# Patient Record
Sex: MALE | Race: WHITE | Employment: STUDENT | URBAN - METROPOLITAN AREA
[De-identification: names, ages, dates, MRNs, and addresses within clinical notes are randomized per-mention and may not be internally consistent; named-entity substitution may affect disease eponyms.]

---
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
## 2023-05-11 ENCOUNTER — OFFICE VISIT (OUTPATIENT)
Dept: OTOLARYNGOLOGY | Facility: CLINIC | Age: 23
End: 2023-05-11
Payer: COMMERCIAL

## 2023-05-11 VITALS — DIASTOLIC BLOOD PRESSURE: 90 MMHG | SYSTOLIC BLOOD PRESSURE: 152 MMHG | HEART RATE: 70 BPM

## 2023-05-11 DIAGNOSIS — J34.2 DEVIATED NASAL SEPTUM: ICD-10-CM

## 2023-05-11 DIAGNOSIS — J35.01 CHRONIC TONSILLITIS: ICD-10-CM

## 2023-05-11 DIAGNOSIS — J30.1 SEASONAL ALLERGIC RHINITIS DUE TO POLLEN: Primary | ICD-10-CM

## 2023-05-11 DIAGNOSIS — J34.89 NASAL OBSTRUCTION: ICD-10-CM

## 2023-05-11 PROCEDURE — 99204 OFFICE O/P NEW MOD 45 MIN: CPT | Performed by: OTOLARYNGOLOGY

## 2023-05-11 RX ORDER — AZELASTINE 1 MG/ML
2 SPRAY, METERED NASAL 2 TIMES DAILY
Qty: 30 ML | Refills: 6 | Status: SHIPPED | OUTPATIENT
Start: 2023-05-11

## 2023-05-11 RX ORDER — LEVOCETIRIZINE DIHYDROCHLORIDE 5 MG/1
5 TABLET, FILM COATED ORAL EVERY EVENING
Qty: 60 TABLET | Refills: 4 | Status: SHIPPED | OUTPATIENT
Start: 2023-05-11

## 2023-05-11 NOTE — PROGRESS NOTES
History of Present Illness - Ruth Castelan is a very pleasant 22 year old male here to see me for the first time but has seen Dr Gonzalez in June 2020 for tonsil hypertrophy and chronic tonsillitis.  He recommended surgery at that time, but the patient and family decided to wait.    He tells me that this is a new issue.  He has noticed that his entire life he has had issues with chronic nasal congestion.  It alternates back and forth.  He does report he had a bad facial injury as a small child.  He hit his nose on the edge of the stairs.  He does note recall if he was taken to the hospital or not.    He has had a few episodes of sinusitis, but nothing chronic.  He does have seasonal allergies, but has never been to an Allergist or had allergy testing.  He uses OTC flonase and some oral antihistamine      Otherwise no previous surgery to the ENT.  He does mention that he still has issues with chronic tonsillitis and tonsil stones    Past Medical History -   Patient Active Problem List   Diagnosis     BMI (body mass index), pediatric, 95-99% for age       Current Medications - No current outpatient medications on file.    Allergies - No Known Allergies    Social History -   Social History     Socioeconomic History     Marital status: Single   Tobacco Use     Smoking status: Never     Smokeless tobacco: Never   Substance and Sexual Activity     Alcohol use: No     Drug use: No     Sexual activity: Never       Family History -   Family History   Problem Relation Age of Onset     Diabetes Maternal Grandmother      Allergies Other      Thyroid Disease Mother      Cancer No family hx of      Hypertension No family hx of      Cerebrovascular Disease No family hx of      Glaucoma No family hx of      Macular Degeneration No family hx of        Review of Systems - As per HPI and PMHx, otherwise 10+ system review of the head and neck, and general constitution is negative.    Physical Exam  BP (!) 152/90   Pulse 70      General - The patient is well nourished and well developed, and appears to have good nutritional status.  Alert and oriented to person and place, answers questions and cooperates with examination appropriately.   Head and Face - Normocephalic and atraumatic, with no gross asymmetry noted of the contour of the facial features.  The facial nerve is intact, with strong symmetric movements.  Voice and Breathing - The patient was breathing comfortably without the use of accessory muscles. There was no wheezing, stridor, or stertor.  The patients voice was clear and strong, and had appropriate pitch and quality.  Ears - The tympanic membranes are normal in appearance, bony landmarks are intact.  No retraction, perforation, or masses.  No fluid or purulence was seen in the external canal or the middle ear. No evidence of infection of the middle ear or external canal, cerumen was normal in appearance.  Eyes - Extraocular movements intact, and the pupils were reactive to light.  Sclera were not icteric or injected, conjunctiva were pink and moist.  Mouth - Examination of the oral cavity showed pink, healthy oral mucosa. No lesions or ulcerations noted.  The tongue was mobile and midline, and the dentition were in good condition.    Throat - The walls of the oropharynx were smooth, pink, moist, symmetric, and had no lesions or ulcerations.  The tonsillar pillars and soft palate were symmetric.  The uvula was midline on elevation.  The tonsils are not that large, but full of crypts and tonsil stones with exudates  Neck - Normal midline excursion of the laryngotracheal complex during swallowing.  Full range of motion on passive movement.  Palpation of the occipital, submental, submandibular, internal jugular chain, and supraclavicular nodes did not demonstrate any abnormal lymph nodes or masses.  The carotid pulse was palpable bilaterally.  Palpation of the thyroid was soft and smooth, with no nodules or goiter appreciated.   The trachea was mobile and midline.  Nose - External contour is symmetric, no gross deflection or scars.  Nasal mucosa is globally boggy and edematous.  The septum is deflected ot the LEFT and obstructive.      A/P - Ruth Castelan is a 22 year old male  (J30.1) Seasonal allergic rhinitis due to pollen  (primary encounter diagnosis)  (J34.2) Deviated nasal septum  (J34.89) Nasal obstruction  (J35.01) Chronic tonsillitis    There is definitely a deviated septum, but I think that allergies are also a major factor in his congestion. I will try him on Astelin, as well as Xyzal, one at a time.  If nothing helps, contact me and we can discuss septoplasty.    With regards to the tonsils, Based on the physical exam and history, my recommendation is for tonsillectomy (with possible adenoidectomy).  The remainder of the visit was spent discussing the risks and benefits of tonsillectomy.  These included:  The risks of general anesthesia, bleeding, infection, possible need for emergency surgery to control bleeding, possible alteration of speech and swallowing, and even the possibility of continued throat problems following surgery.  They understood and will think about.  Perhaps doing it simultaneously if we end up doing a septo

## 2023-05-11 NOTE — LETTER
5/11/2023         RE: Ruth Castelan  [address]  [place] [facility] [address]        Dear Colleague,    Thank you for referring your patient, Ruth Castelan, to the [facility]. Please see a copy of my visit note below.    History of Present Illness - Ruth Castelan is a very pleasant 22 year old male here to see me for the first time but has seen Dr Gonzalez in June 2020 for tonsil hypertrophy and chronic tonsillitis.  He recommended surgery at that time, but the patient and family decided to wait.    He tells me that this is a new issue.  He has noticed that his entire life he has had issues with chronic nasal congestion.  It alternates back and forth.  He does report he had a bad facial injury as a small child.  He hit his nose on the edge of the stairs.  He does note recall if he was taken to the hospital or not.    He has had a few episodes of sinusitis, but nothing chronic.  He does have seasonal allergies, but has never been to an Allergist or had allergy testing.  He uses OTC flonase and some oral antihistamine      Otherwise no previous surgery to the ENT.  He does mention that he still has issues with chronic tonsillitis and tonsil stones    Past Medical History -   Patient Active Problem List   Diagnosis     BMI (body mass index), pediatric, 95-99% for age       Current Medications - No current outpatient medications on file.    Allergies - No Known Allergies    Social History -   Social History     Socioeconomic History     Marital status: Single   Tobacco Use     Smoking status: Never     Smokeless tobacco: Never   Substance and Sexual Activity     Alcohol use: No     Drug use: No     Sexual activity: Never       Family History -   Family History   Problem Relation Age of Onset     Diabetes Maternal Grandmother      Allergies Other      Thyroid Disease Mother      Cancer No family hx of      Hypertension No family hx of      Cerebrovascular Disease No family hx of       Glaucoma No family hx of      Macular Degeneration No family hx of        Review of Systems - As per HPI and PMHx, otherwise 10+ system review of the head and neck, and general constitution is negative.    Physical Exam  BP (!) 152/90   Pulse 70     General - The patient is well nourished and well developed, and appears to have good nutritional status.  Alert and oriented to person and place, answers questions and cooperates with examination appropriately.   Head and Face - Normocephalic and atraumatic, with no gross asymmetry noted of the contour of the facial features.  The facial nerve is intact, with strong symmetric movements.  Voice and Breathing - The patient was breathing comfortably without the use of accessory muscles. There was no wheezing, stridor, or stertor.  The patients voice was clear and strong, and had appropriate pitch and quality.  Ears - The tympanic membranes are normal in appearance, bony landmarks are intact.  No retraction, perforation, or masses.  No fluid or purulence was seen in the external canal or the middle ear. No evidence of infection of the middle ear or external canal, cerumen was normal in appearance.  Eyes - Extraocular movements intact, and the pupils were reactive to light.  Sclera were not icteric or injected, conjunctiva were pink and moist.  Mouth - Examination of the oral cavity showed pink, healthy oral mucosa. No lesions or ulcerations noted.  The tongue was mobile and midline, and the dentition were in good condition.    Throat - The walls of the oropharynx were smooth, pink, moist, symmetric, and had no lesions or ulcerations.  The tonsillar pillars and soft palate were symmetric.  The uvula was midline on elevation.  The tonsils are not that large, but full of crypts and tonsil stones with exudates  Neck - Normal midline excursion of the laryngotracheal complex during swallowing.  Full range of motion on passive movement.  Palpation of the occipital, submental,  submandibular, internal jugular chain, and supraclavicular nodes did not demonstrate any abnormal lymph nodes or masses.  The carotid pulse was palpable bilaterally.  Palpation of the thyroid was soft and smooth, with no nodules or goiter appreciated.  The trachea was mobile and midline.  Nose - External contour is symmetric, no gross deflection or scars.  Nasal mucosa is globally boggy and edematous.  The septum is deflected ot the LEFT and obstructive.      A/P - Edvarbob Castelan is a 22 year old male  (J30.1) Seasonal allergic rhinitis due to pollen  (primary encounter diagnosis)  (J34.2) Deviated nasal septum  (J34.89) Nasal obstruction  (J35.01) Chronic tonsillitis    There is definitely a deviated septum, but I think that allergies are also a major factor in his congestion. I will try him on Astelin, as well as Xyzal, one at a time.  If nothing helps, contact me and we can discuss septoplasty.    With regards to the tonsils, Based on the physical exam and history, my recommendation is for tonsillectomy (with possible adenoidectomy).  The remainder of the visit was spent discussing the risks and benefits of tonsillectomy.  These included:  The risks of general anesthesia, bleeding, infection, possible need for emergency surgery to control bleeding, possible alteration of speech and swallowing, and even the possibility of continued throat problems following surgery.  They understood and will think about.  Perhaps doing it simultaneously if we end up doing a septo          Again, thank you for allowing me to participate in the care of your patient.        Sincerely,        Marin Dodson MD

## 2023-11-21 ENCOUNTER — OFFICE VISIT (OUTPATIENT)
Dept: FAMILY MEDICINE | Facility: CLINIC | Age: 23
End: 2023-11-21
Payer: COMMERCIAL

## 2023-11-21 VITALS
HEART RATE: 66 BPM | HEIGHT: 68 IN | BODY MASS INDEX: 30.22 KG/M2 | SYSTOLIC BLOOD PRESSURE: 132 MMHG | TEMPERATURE: 98.3 F | WEIGHT: 199.4 LBS | OXYGEN SATURATION: 100 % | RESPIRATION RATE: 20 BRPM | DIASTOLIC BLOOD PRESSURE: 89 MMHG

## 2023-11-21 DIAGNOSIS — M25.60 JOINT STIFFNESS: ICD-10-CM

## 2023-11-21 DIAGNOSIS — Z11.4 SCREENING FOR HUMAN IMMUNODEFICIENCY VIRUS: ICD-10-CM

## 2023-11-21 DIAGNOSIS — Z11.59 NEED FOR HEPATITIS C SCREENING TEST: ICD-10-CM

## 2023-11-21 DIAGNOSIS — R53.83 OTHER FATIGUE: ICD-10-CM

## 2023-11-21 DIAGNOSIS — Z86.19 HISTORY OF LYME DISEASE: ICD-10-CM

## 2023-11-21 DIAGNOSIS — M25.50 POLYARTHRALGIA: Primary | ICD-10-CM

## 2023-11-21 LAB
ALBUMIN SERPL BCG-MCNC: 4.9 G/DL (ref 3.5–5.2)
ALP SERPL-CCNC: 58 U/L (ref 40–150)
ALT SERPL W P-5'-P-CCNC: 35 U/L (ref 0–70)
ANION GAP SERPL CALCULATED.3IONS-SCNC: 8 MMOL/L (ref 7–15)
AST SERPL W P-5'-P-CCNC: 28 U/L (ref 0–45)
BASOPHILS # BLD AUTO: 0 10E3/UL (ref 0–0.2)
BASOPHILS NFR BLD AUTO: 1 %
BILIRUB SERPL-MCNC: 0.3 MG/DL
BUN SERPL-MCNC: 11.3 MG/DL (ref 6–20)
CALCIUM SERPL-MCNC: 9.9 MG/DL (ref 8.6–10)
CHLORIDE SERPL-SCNC: 103 MMOL/L (ref 98–107)
CREAT SERPL-MCNC: 0.9 MG/DL (ref 0.67–1.17)
CRP SERPL-MCNC: <3 MG/L
DEPRECATED HCO3 PLAS-SCNC: 28 MMOL/L (ref 22–29)
EGFRCR SERPLBLD CKD-EPI 2021: >90 ML/MIN/1.73M2
EOSINOPHIL # BLD AUTO: 0.2 10E3/UL (ref 0–0.7)
EOSINOPHIL NFR BLD AUTO: 4 %
ERYTHROCYTE [DISTWIDTH] IN BLOOD BY AUTOMATED COUNT: 11.7 % (ref 10–15)
ERYTHROCYTE [SEDIMENTATION RATE] IN BLOOD BY WESTERGREN METHOD: 4 MM/HR (ref 0–15)
FERRITIN SERPL-MCNC: 71 NG/ML (ref 31–409)
GLUCOSE SERPL-MCNC: 91 MG/DL (ref 70–99)
HCT VFR BLD AUTO: 44.7 % (ref 40–53)
HGB BLD-MCNC: 15.3 G/DL (ref 13.3–17.7)
HIV 1+2 AB+HIV1 P24 AG SERPL QL IA: NONREACTIVE
IMM GRANULOCYTES # BLD: 0 10E3/UL
IMM GRANULOCYTES NFR BLD: 0 %
LYMPHOCYTES # BLD AUTO: 2 10E3/UL (ref 0.8–5.3)
LYMPHOCYTES NFR BLD AUTO: 37 %
MCH RBC QN AUTO: 29.1 PG (ref 26.5–33)
MCHC RBC AUTO-ENTMCNC: 34.2 G/DL (ref 31.5–36.5)
MCV RBC AUTO: 85 FL (ref 78–100)
MONOCYTES # BLD AUTO: 0.4 10E3/UL (ref 0–1.3)
MONOCYTES NFR BLD AUTO: 7 %
NEUTROPHILS # BLD AUTO: 2.8 10E3/UL (ref 1.6–8.3)
NEUTROPHILS NFR BLD AUTO: 51 %
PLATELET # BLD AUTO: 265 10E3/UL (ref 150–450)
POTASSIUM SERPL-SCNC: 4.6 MMOL/L (ref 3.4–5.3)
PROT SERPL-MCNC: 7.9 G/DL (ref 6.4–8.3)
RBC # BLD AUTO: 5.26 10E6/UL (ref 4.4–5.9)
RHEUMATOID FACT SERPL-ACNC: <10 IU/ML
SODIUM SERPL-SCNC: 139 MMOL/L (ref 135–145)
TSH SERPL DL<=0.005 MIU/L-ACNC: 2 UIU/ML (ref 0.3–4.2)
URATE SERPL-MCNC: 7 MG/DL (ref 3.4–7)
VIT D+METAB SERPL-MCNC: 31 NG/ML (ref 20–50)
WBC # BLD AUTO: 5.4 10E3/UL (ref 4–11)

## 2023-11-21 PROCEDURE — 85025 COMPLETE CBC W/AUTO DIFF WBC: CPT | Performed by: FAMILY MEDICINE

## 2023-11-21 PROCEDURE — 99204 OFFICE O/P NEW MOD 45 MIN: CPT | Performed by: FAMILY MEDICINE

## 2023-11-21 PROCEDURE — 85652 RBC SED RATE AUTOMATED: CPT | Performed by: FAMILY MEDICINE

## 2023-11-21 PROCEDURE — 82306 VITAMIN D 25 HYDROXY: CPT | Performed by: FAMILY MEDICINE

## 2023-11-21 PROCEDURE — 99000 SPECIMEN HANDLING OFFICE-LAB: CPT | Performed by: FAMILY MEDICINE

## 2023-11-21 PROCEDURE — 36415 COLL VENOUS BLD VENIPUNCTURE: CPT | Performed by: FAMILY MEDICINE

## 2023-11-21 PROCEDURE — 86431 RHEUMATOID FACTOR QUANT: CPT | Performed by: FAMILY MEDICINE

## 2023-11-21 PROCEDURE — 86038 ANTINUCLEAR ANTIBODIES: CPT | Performed by: FAMILY MEDICINE

## 2023-11-21 PROCEDURE — 84443 ASSAY THYROID STIM HORMONE: CPT | Performed by: FAMILY MEDICINE

## 2023-11-21 PROCEDURE — 84550 ASSAY OF BLOOD/URIC ACID: CPT | Performed by: FAMILY MEDICINE

## 2023-11-21 PROCEDURE — 86618 LYME DISEASE ANTIBODY: CPT | Performed by: FAMILY MEDICINE

## 2023-11-21 PROCEDURE — 86140 C-REACTIVE PROTEIN: CPT | Performed by: FAMILY MEDICINE

## 2023-11-21 PROCEDURE — 86617 LYME DISEASE ANTIBODY: CPT | Performed by: FAMILY MEDICINE

## 2023-11-21 PROCEDURE — 86803 HEPATITIS C AB TEST: CPT | Performed by: FAMILY MEDICINE

## 2023-11-21 PROCEDURE — 87476 LYME DIS DNA AMP PROBE: CPT | Mod: 90 | Performed by: FAMILY MEDICINE

## 2023-11-21 PROCEDURE — 87389 HIV-1 AG W/HIV-1&-2 AB AG IA: CPT | Performed by: FAMILY MEDICINE

## 2023-11-21 PROCEDURE — 80053 COMPREHEN METABOLIC PANEL: CPT | Performed by: FAMILY MEDICINE

## 2023-11-21 PROCEDURE — 82728 ASSAY OF FERRITIN: CPT | Performed by: FAMILY MEDICINE

## 2023-11-21 NOTE — PROGRESS NOTES
Assessment & Plan     Polyarthralgia  With a previous history of Lyme disease history of tick bites and progressively worsening symptoms of polyarthralgia, morning stiffness and fatigue we will proceed with lab evaluation as mentioned below  Has family history of thyroid disease in mother   we will check the thyroid labs too  Will f/u on results and call with recommendations.  .vwer    ,  - CBC with platelets and differential; Future  - Comprehensive metabolic panel (BMP + Alb, Alk Phos, ALT, AST, Total. Bili, TP); Future  - TSH with free T4 reflex; Future  - ESR: Erythrocyte sedimentation rate; Future  - CRP, inflammation; Future  - Vitamin D Deficiency; Future  - Anti Nuclear Leonor IgG by IFA with Reflex; Future  - Rheumatoid factor; Future  - Lyme disease DNA detection by PCR; Future  - **Lyme Disease Total Abs Bld with Reflex to Confirm CLIA FUTURE 14d; Future  - Uric acid; Future  - CBC with platelets and differential  - Comprehensive metabolic panel (BMP + Alb, Alk Phos, ALT, AST, Total. Bili, TP)  - TSH with free T4 reflex  - ESR: Erythrocyte sedimentation rate  - CRP, inflammation  - Vitamin D Deficiency  - Anti Nuclear Leonor IgG by IFA with Reflex  - Rheumatoid factor  - Lyme disease DNA detection by PCR  - **Lyme Disease Total Abs Bld with Reflex to Confirm CLIA FUTURE 14d  - Uric acid    Joint stiffness  as above    - CBC with platelets and differential; Future  - Comprehensive metabolic panel (BMP + Alb, Alk Phos, ALT, AST, Total. Bili, TP); Future  - TSH with free T4 reflex; Future  - ESR: Erythrocyte sedimentation rate; Future  - CRP, inflammation; Future  - Vitamin D Deficiency; Future  - Anti Nuclear Leonor IgG by IFA with Reflex; Future  - Rheumatoid factor; Future  - Lyme disease DNA detection by PCR; Future  - **Lyme Disease Total Abs Bld with Reflex to Confirm CLIA FUTURE 14d; Future  - CBC with platelets and differential  - Comprehensive metabolic panel (BMP + Alb, Alk Phos, ALT, AST, Total. Bili,  "TP)  - TSH with free T4 reflex  - ESR: Erythrocyte sedimentation rate  - CRP, inflammation  - Vitamin D Deficiency  - Anti Nuclear Leonor IgG by IFA with Reflex  - Rheumatoid factor  - Lyme disease DNA detection by PCR  - **Lyme Disease Total Abs Bld with Reflex to Confirm CLIA FUTURE 14d    History of Lyme disease  as above    - Lyme disease DNA detection by PCR; Future  - **Lyme Disease Total Abs Bld with Reflex to Confirm CLIA FUTURE 14d; Future  - Lyme disease DNA detection by PCR  - **Lyme Disease Total Abs Bld with Reflex to Confirm CLIA FUTURE 14d    Other fatigue  as above    - Ferritin; Future  - Ferritin  -CBC with platelets  -Comprehensive metabolic panel  -TSH with free T4 reflux  -Vitamin D deficiency    Need for hepatitis C screening test    - Hepatitis C antibody; Future  - Hepatitis C antibody    Screening for human immunodeficiency virus    - HIV Antigen Antibody Combo; Future  - HIV Antigen Antibody Combo    Review of the result(s) of each unique test - vitamin D, Lyme disease screen through Care Everywhere         BMI:   Estimated body mass index is 30.32 kg/m  as calculated from the following:    Height as of this encounter: 1.727 m (5' 8\").    Weight as of this encounter: 90.4 kg (199 lb 6.4 oz).       Chart documentation done in part with Dragon Voice recognition Software. Although reviewed after completion, some word and grammatical error may remain.    See Patient Instructions    Azul Cameron MD  [facility]    Donaldo Miranda is a 22 year old, presenting for the following health issues:  Joint Pain  Patient is new to the provider, is here today with concerns of having multiple joint pains including shoulders, elbows, knees, feet, back, hands and fingers, with knee joints hurting more than the other joints progressively getting worse for more than 6 to 8 weeks now,  Had history of Lyme disease 5 years ago  Patient had history of tick bites last summer  He is " currently working and is in school   has family history of hypothyroidism in mother  Patient does notice joint stiffness lasting for close to 30 to 35 minutes in the morning  Denies joint swellings, muscle aches  Does have ongoing fatigue  Denies sleep problems, snoring, depression, anxiety  Denies underlying history of anemia, thyroid disorder, diabetes,Family history of autoimmune inflammatory disorders  Has occasional tingling of extremities but denies numbness or weakness of extremities  Denies nausea, vomiting, fever, chills, abnormal skin rashes, weight loss, abdominal pain, diarrhea, constipation, problem with urination  Patient denies use of tobacco, alcohol, recreational drugs      11/21/2023    10:03 AM   Additional Questions   Roomed by Norma   Accompanied by Self         11/21/2023    10:03 AM   Patient Reported Additional Medications   Patient reports taking the following new medications None       History of Present Illness       Reason for visit:  New pain in my joints, weakness in my joints  Symptom onset:  More than a month  Symptoms include:  Pain in joints  Symptom intensity:  Moderate  Symptom progression:  Worsening  Had these symptoms before:  No  What makes it worse:  Sitting for too long, too much physical activity  What makes it better:  Laying down    He eats 0-1 servings of fruits and vegetables daily.He consumes 1 sweetened beverage(s) daily.He exercises with enough effort to increase his heart rate 20 to 29 minutes per day.  He exercises with enough effort to increase his heart rate 3 or less days per week.   He is taking medications regularly.                 Review of Systems   CONSTITUTIONAL: NEGATIVE for fever, chills, change in weight  INTEGUMENTARY/SKIN: NEGATIVE for worrisome rashes, moles or lesions  EYES: NEGATIVE for vision changes or irritation  ENT/MOUTH: NEGATIVE for ear, mouth and throat problems  ENT/MOUTH: History of seasonal allergies  RESP: NEGATIVE for significant cough  "or SOB  CV: NEGATIVE for chest pain, palpitations or peripheral edema  GI: NEGATIVE for nausea, abdominal pain, heartburn, or change in bowel habits  : negative for dysuria, hematuria, decreased urinary stream, erectile dysfunction  MUSCULOSKELETAL: As above  NEURO: NEGATIVE for weakness, dizziness or paresthesias  ENDOCRINE: NEGATIVE for temperature intolerance, skin/hair changes  HEME/ALLERGY/IMMUNE: NEGATIVE for bleeding problems  PSYCHIATRIC: NEGATIVE for changes in mood or affect      Objective    /89 (BP Location: Right arm, Patient Position: Sitting, Cuff Size: Adult Large)   Pulse 66   Temp 98.3  F (36.8  C) (Oral)   Resp 20   Ht 1.727 m (5' 8\")   Wt 90.4 kg (199 lb 6.4 oz)   SpO2 100%   BMI 30.32 kg/m    Body mass index is 30.32 kg/m .  Physical Exam   GENERAL: healthy, alert and no distress  EYES: Eyes grossly normal to inspection  HENT: oropharynx clear and oral mucous membranes moist  RESP: lungs clear to auscultation - no rales, rhonchi or wheezes  CV: regular rate and rhythm, normal S1 S2, no S3 or S4, no murmur, click or rub, no peripheral edema and peripheral pulses strong  MS: no gross musculoskeletal defects noted, no edema  SKIN: no suspicious lesions or rashes  NEURO: Normal strength and tone, mentation intact and speech normal  PSYCH: mentation appears normal, affect normal/bright    Labs-in process                  "

## 2023-11-22 LAB
ANA SER QL IF: NEGATIVE
B BURGDOR IGG SERPL QL IA: 1.95 INDEX
B BURGDOR IGG SERPL QL IA: REACTIVE
B BURGDOR IGG+IGM SER QL: 1.03
B BURGDOR IGM SERPL QL IA: 0.79 INDEX
B BURGDOR IGM SERPL QL IA: NONREACTIVE
HCV AB SERPL QL IA: NONREACTIVE

## 2023-11-25 LAB — B BURGDOR DNA SPEC QL NAA+PROBE: NOT DETECTED

## 2023-11-27 ENCOUNTER — TELEPHONE (OUTPATIENT)
Dept: FAMILY MEDICINE | Facility: CLINIC | Age: 23
End: 2023-11-27
Payer: COMMERCIAL

## 2023-11-27 DIAGNOSIS — M25.50 POLYARTHRALGIA: ICD-10-CM

## 2023-11-27 DIAGNOSIS — Z86.19 HISTORY OF LYME DISEASE: Primary | ICD-10-CM

## 2023-11-27 DIAGNOSIS — R53.83 OTHER FATIGUE: ICD-10-CM

## 2023-11-27 DIAGNOSIS — M25.60 JOINT STIFFNESS: ICD-10-CM

## 2023-11-27 RX ORDER — DOXYCYCLINE 100 MG/1
100 CAPSULE ORAL 2 TIMES DAILY
Qty: 28 CAPSULE | Refills: 0 | Status: SHIPPED | OUTPATIENT
Start: 2023-11-27 | End: 2023-12-11

## 2023-11-27 NOTE — TELEPHONE ENCOUNTER
Please call patient with lab results-all normal except for positive IgG antibodies for Lyme disease indicating previous infection but not current infection  Given his multiple ongoing symptoms, I would recommend to get treated with 2 weeks of doxycycline  Prescription sent to his pharmacy  I will also recommend to consult infectious disease specialist for further evaluation  Referral placed today, please give the contact information to patient for scheduling

## 2023-11-27 NOTE — PROGRESS NOTES
New Referral Triage: Lyme Disease     Date of Diagnosis: 2018    Previous Treatment: 2 weeks doxy starting 23     Date of IGM and IGG testin23     Is Appointment Necessary: Routine       *Scheduling criteria: If Diagnosed >4 weeks ago, IGM negative/ IGG positive, or have been previously treated, OK to schedule as routine.

## 2023-11-27 NOTE — TELEPHONE ENCOUNTER
Called preferred number on file [phone] and reached patient's mother Laquita. Requested mother to have patient call clinic back. Mom was hesitant and requested to know what call was about. Writer advise unable to share this information as there is no CTC on file for mom. Mom expressed concerned about getting spam calls and calls from unknown numbers and requested writer confirm which clinic writer calling from to make sure it's safe to have patient call back. Writer confirm [facility] with a message from his PCP Dr. Cameron, afterwards mom confirmed writer can call patient's cell [phone] to reach patient directly.    Writer called patient's mobile phone and relayed provider's message below. Patient states was already contacted to schedule infectious disease appointment and does not need referral number. Reviewed sig ( Take 1 capsule (100 mg) by mouth 2 times daily for 14 days) with patient for Doxycycline Hyclate per patient request. Advise to  medication at [facility]. Patient verbalized understanding and denies further questions at this time.     DANII Duncan  [facility] Triage  [place]

## 2024-02-02 ENCOUNTER — TELEPHONE (OUTPATIENT)
Dept: FAMILY MEDICINE | Facility: CLINIC | Age: 24
End: 2024-02-02

## 2024-02-02 ENCOUNTER — OFFICE VISIT (OUTPATIENT)
Dept: FAMILY MEDICINE | Facility: CLINIC | Age: 24
End: 2024-02-02
Payer: COMMERCIAL

## 2024-02-02 VITALS
HEART RATE: 81 BPM | HEIGHT: 69 IN | BODY MASS INDEX: 30.51 KG/M2 | TEMPERATURE: 99 F | DIASTOLIC BLOOD PRESSURE: 72 MMHG | OXYGEN SATURATION: 99 % | WEIGHT: 206 LBS | SYSTOLIC BLOOD PRESSURE: 105 MMHG | RESPIRATION RATE: 18 BRPM

## 2024-02-02 DIAGNOSIS — J10.1 INFLUENZA A: Primary | ICD-10-CM

## 2024-02-02 DIAGNOSIS — J02.9 SORE THROAT: ICD-10-CM

## 2024-02-02 PROBLEM — J06.9 UPPER RESPIRATORY TRACT INFECTION, UNSPECIFIED TYPE: Status: ACTIVE | Noted: 2024-02-02

## 2024-02-02 LAB
DEPRECATED S PYO AG THROAT QL EIA: NEGATIVE
FLUAV RNA SPEC QL NAA+PROBE: POSITIVE
FLUBV RNA RESP QL NAA+PROBE: NEGATIVE
GROUP A STREP BY PCR: NOT DETECTED
RSV RNA SPEC NAA+PROBE: NEGATIVE
SARS-COV-2 RNA RESP QL NAA+PROBE: NEGATIVE

## 2024-02-02 PROCEDURE — 87637 SARSCOV2&INF A&B&RSV AMP PRB: CPT | Performed by: NURSE PRACTITIONER

## 2024-02-02 PROCEDURE — 99214 OFFICE O/P EST MOD 30 MIN: CPT | Performed by: NURSE PRACTITIONER

## 2024-02-02 PROCEDURE — 87651 STREP A DNA AMP PROBE: CPT | Performed by: NURSE PRACTITIONER

## 2024-02-02 RX ORDER — BENZONATATE 200 MG/1
200 CAPSULE ORAL 3 TIMES DAILY PRN
Qty: 21 CAPSULE | Refills: 0 | Status: SHIPPED | OUTPATIENT
Start: 2024-02-02

## 2024-02-02 ASSESSMENT — ENCOUNTER SYMPTOMS: COUGH: 1

## 2024-02-02 NOTE — PROGRESS NOTES
"  Assessment & Plan     (J10.1) Influenza A  (primary encounter diagnosis)  Plan: benzonatate (TESSALON) 200 MG capsule,         Symptomatic Influenza A/B, RSV, & SARS-CoV2 PCR        (COVID-19) Nasopharyngea  -benzonatate (TESSALON) 200 MG capsule,         Symptomatic Influenza A/B, RSV, & SARS-CoV2 PCR        (COVID-19) Nasopharyngeal.  -Should improve gradually over time with rest, hydration and good nutrition. Tylenol or Ibuprofen as needed for fever or pain.   -Increase humidity using humidifier by bedside.  -Nasal saline prn.  -Return to clinic if not improved over the next several days, or if getting worse.       (J02.9) Sore throat  Plan: Streptococcus A Rapid Screen w/Reflex to PCR -         Clinic Collect   - Salt water gargles, ice chips to soothe throat               BMI  Estimated body mass index is 30.87 kg/m  as calculated from the following:    Height as of this encounter: 1.74 m (5' 8.5\").    Weight as of this encounter: 93.4 kg (206 lb).   Weight management plan: Discussed healthy diet and exercise guidelines          Donaldo Miranda is a 23 year old, presenting for the following health issues:  Cough        2/2/2024     1:56 PM   Additional Questions   Roomed by Sandy   Accompanied by mom     History of Present Illness       Headaches:   Since the patient's last clinic visit, headaches are: no change  The patient is getting headaches:  Occasionally  He is not able to do normal daily activities when he has a migraine.  The patient is taking the following rescue/relief medications:  Tylenol   Patient states \"I get some relief\" from the rescue/relief medications.     In the past 4 weeks, the patient has gone to an Urgent Care or Emergency Room 0 times times due to headaches.    Reason for visit:  Fever sick  Symptom onset:  1-3 days ago  Symptoms include:  Cough headache muscle pain fever fatique chills  Symptom intensity:  Moderate  Symptom progression:  Staying the same  Had these symptoms " "before:  No    He eats 2-3 servings of fruits and vegetables daily.He consumes 0 sweetened beverage(s) daily.He exercises with enough effort to increase his heart rate 60 or more minutes per day.  He exercises with enough effort to increase his heart rate 4 days per week.   He is taking medications regularly.     Ruth Castelan is a 23 year old male complains of rhinorrhea, fever with cough, headaches, myalgia, arthralgias, and sore throat.   Unvaccinated against influenza.   No sick contacts.                Review of Systems  Constitutional, HEENT, cardiovascular, pulmonary, gi and gu systems are negative, except as otherwise noted.      Objective    /72   Pulse 81   Temp 99  F (37.2  C) (Oral)   Resp 18   Ht 1.74 m (5' 8.5\")   Wt 93.4 kg (206 lb)   SpO2 99%   BMI 30.87 kg/m    Body mass index is 30.87 kg/m .  Physical Exam  Constitutional:       Appearance: Normal appearance.   HENT:      Head: Normocephalic and atraumatic.      Right Ear: Tympanic membrane, ear canal and external ear normal.      Left Ear: Tympanic membrane, ear canal and external ear normal.      Nose: Congestion and rhinorrhea present.      Right Turbinates: Enlarged, swollen and pale.      Left Turbinates: Enlarged, swollen and pale.      Mouth/Throat:      Mouth: Mucous membranes are moist.      Pharynx: Uvula midline. Posterior oropharyngeal erythema present. No pharyngeal swelling or oropharyngeal exudate.   Eyes:      Extraocular Movements: Extraocular movements intact.      Conjunctiva/sclera: Conjunctivae normal.      Pupils: Pupils are equal, round, and reactive to light.   Cardiovascular:      Rate and Rhythm: Normal rate and regular rhythm.      Pulses: Normal pulses.      Heart sounds: Normal heart sounds.   Pulmonary:      Effort: Pulmonary effort is normal.      Breath sounds: Normal breath sounds.   Abdominal:      General: Abdomen is flat. Bowel sounds are normal.      Palpations: Abdomen is soft. "   Musculoskeletal:      Cervical back: Normal range of motion and neck supple.      Right lower leg: No edema.      Left lower leg: No edema.   Skin:     Capillary Refill: Capillary refill takes less than 2 seconds.      Findings: No lesion or rash.   Neurological:      Mental Status: He is alert and oriented to person, place, and time.   Psychiatric:         Mood and Affect: Mood normal.         Behavior: Behavior normal.         Thought Content: Thought content normal.         Judgment: Judgment normal.                  Signed Electronically by: ROCAEL Franco CNP

## 2024-02-02 NOTE — PATIENT INSTRUCTIONS
Increase fluid intake, rest.  Fever control with Tylenol.  Salt water gargles, ice chips to soothe throat.  Increase humidity using humidifier by bedside.   Nasal saline as needed.  Return to clinic if not improved over the next several days, or if getting worse.

## 2024-03-01 ENCOUNTER — OFFICE VISIT (OUTPATIENT)
Dept: INFECTIOUS DISEASES | Facility: CLINIC | Age: 24
End: 2024-03-01
Payer: COMMERCIAL

## 2024-03-01 VITALS
TEMPERATURE: 97.8 F | WEIGHT: 203.6 LBS | HEART RATE: 75 BPM | DIASTOLIC BLOOD PRESSURE: 74 MMHG | BODY MASS INDEX: 30.51 KG/M2 | SYSTOLIC BLOOD PRESSURE: 125 MMHG | OXYGEN SATURATION: 97 %

## 2024-03-01 DIAGNOSIS — M25.50 POLYARTHRALGIA: ICD-10-CM

## 2024-03-01 DIAGNOSIS — Z86.19 HISTORY OF LYME DISEASE: ICD-10-CM

## 2024-03-01 DIAGNOSIS — R53.83 OTHER FATIGUE: ICD-10-CM

## 2024-03-01 DIAGNOSIS — M25.60 JOINT STIFFNESS: ICD-10-CM

## 2024-03-01 PROCEDURE — 99205 OFFICE O/P NEW HI 60 MIN: CPT | Performed by: INTERNAL MEDICINE

## 2024-03-01 ASSESSMENT — PAIN SCALES - GENERAL: PAINLEVEL: MODERATE PAIN (5)

## 2024-03-01 NOTE — NURSING NOTE
"Ruth Castelan's goals for this visit include:   Chief Complaint   Patient presents with    Consult     Consult for History of Lyme disease  Polyarthralgia  Joint stiffness  Other fatigue           PCP: No Ref-Primary, Physician    Referring Provider:  Azul Cameron MD  [address]  [place] [id number]      Initial /74 (BP Location: Left arm, Patient Position: Sitting, Cuff Size: Adult Regular)   Pulse 75   Temp 97.8  F (36.6  C) (Oral)   Wt 92.4 kg (203 lb 9.6 oz)   SpO2 97%   BMI 30.51 kg/m   Estimated body mass index is 30.51 kg/m  as calculated from the following:    Height as of 2/2/24: 1.74 m (5' 8.5\").    Weight as of this encounter: 92.4 kg (203 lb 9.6 oz).    Medication Reconciliation: complete    Do you need any medication refills at today's visit? none    JONO De Dios  Rheumatology/Infectious disease  [facility]   [phone]      "
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
## 2024-08-26 ENCOUNTER — TELEPHONE (OUTPATIENT)
Dept: FAMILY MEDICINE | Facility: CLINIC | Age: 24
End: 2024-08-26
Payer: COMMERCIAL

## 2024-09-08 ENCOUNTER — TELEPHONE (OUTPATIENT)
Dept: FAMILY MEDICINE | Facility: CLINIC | Age: 24
End: 2024-09-08
Payer: COMMERCIAL

## 2024-09-08 NOTE — TELEPHONE ENCOUNTER
Patient Quality Outreach    Patient is due for the following:   Diabetes -  Eye Exam  Physical Preventive Adult Physical      Topic Date Due    Diptheria Tetanus Pertussis (DTAP/TDAP/TD) Vaccine (5 - Tdap) 12/07/2009       Next Steps:   Schedule a Adult Preventative    Type of outreach:    Sent letter.      Questions for provider review:    None           Sidra Trevizo MA  Chart routed to Care Team.

## 2024-09-08 NOTE — LETTER
September 8, 2024      Ruth Castelan  [address]  [place] [address]    Your team at [facility] cares about your health. We have reviewed your chart and based on our findings; we are making the following recommendations to better manage your health.     You are in particular need of attention regarding the following:     Schedule a DIABETIC EYE EXAM.  This exam is done with an optometrist, annually. You can schedule this appointment with your eye doctor.  If you need a referral, please let us know.  PREVENTATIVE VISIT: Physical  Please schedule a Nurse Only Appointment with your primary care clinic to update your immunizations that are due.    If you have already completed these items, please contact the clinic via phone or   Telekenexhart so your care team can review and update your records. Thank you for   choosing [facility] for your healthcare needs. For any questions,   concerns, or to schedule an appointment please contact our clinic.    Healthy Regards,      Your [facility] Care Team

## 2024-09-17 NOTE — TELEPHONE ENCOUNTER
Central scheduling scheduled patient appt for today and then transferred patient to RN to further discuss some questions.     Unable to measure temp, but he feels slightly warm per mother    No SOB, no difficulty breathing, no chest pain, no wheezing.    Yesterday, he was negative for covid    She was wondering about tylenol or ibuprofen. RN advised tylenol and to follow instructions for dosing on the box as that can help with fevers. RN advised patient to rest and stay hydrated until appt this afternoon.    RN advised when to seek emergency medical attention and patient and mother verbalized understanding    Starr Mckinley RN     Quality 355: Unplanned Reoperation Within The 30 Day Postoperative Period: No return to the operating room for a surgical procedure, for complications of the principal operative procedure, within 30 days of the principal operative procedure Detail Level: Detailed Quality 357: Surgical Site Infection (Ssi): No surgical site infection